# Patient Record
Sex: FEMALE | Race: WHITE | NOT HISPANIC OR LATINO | Employment: FULL TIME | URBAN - METROPOLITAN AREA
[De-identification: names, ages, dates, MRNs, and addresses within clinical notes are randomized per-mention and may not be internally consistent; named-entity substitution may affect disease eponyms.]

---

## 2020-05-05 DIAGNOSIS — Z01.84 ANTIBODY RESPONSE EXAMINATION: ICD-10-CM

## 2020-06-04 DIAGNOSIS — Z01.84 ANTIBODY RESPONSE EXAMINATION: ICD-10-CM

## 2020-06-24 ENCOUNTER — OFFICE VISIT (OUTPATIENT)
Dept: URGENT CARE | Facility: CLINIC | Age: 55
End: 2020-06-24
Payer: COMMERCIAL

## 2020-06-24 VITALS
HEART RATE: 68 BPM | DIASTOLIC BLOOD PRESSURE: 72 MMHG | BODY MASS INDEX: 23.92 KG/M2 | HEIGHT: 62 IN | WEIGHT: 130 LBS | TEMPERATURE: 97 F | OXYGEN SATURATION: 99 % | SYSTOLIC BLOOD PRESSURE: 109 MMHG

## 2020-06-24 DIAGNOSIS — Z03.818 ENCOUNTER FOR OBSERVATION FOR SUSPECTED EXPOSURE TO OTHER BIOLOGICAL AGENTS RULED OUT: ICD-10-CM

## 2020-06-24 PROCEDURE — U0003 INFECTIOUS AGENT DETECTION BY NUCLEIC ACID (DNA OR RNA); SEVERE ACUTE RESPIRATORY SYNDROME CORONAVIRUS 2 (SARS-COV-2) (CORONAVIRUS DISEASE [COVID-19]), AMPLIFIED PROBE TECHNIQUE, MAKING USE OF HIGH THROUGHPUT TECHNOLOGIES AS DESCRIBED BY CMS-2020-01-R: HCPCS

## 2020-06-24 PROCEDURE — 99201 PR OFFICE/OUTPT VISIT,NEW,LEVL I: ICD-10-PCS | Mod: S$GLB,,, | Performed by: PHYSICIAN ASSISTANT

## 2020-06-24 PROCEDURE — 99201 PR OFFICE/OUTPT VISIT,NEW,LEVL I: CPT | Mod: S$GLB,,, | Performed by: PHYSICIAN ASSISTANT

## 2020-06-24 RX ORDER — LISINOPRIL 10 MG/1
10 TABLET ORAL DAILY
COMMUNITY

## 2020-06-24 NOTE — PATIENT INSTRUCTIONS
1.  Take all medications as directed. If you have been prescribed antibiotics, make sure to complete them.   2.  Rest and keep yourself/patient well hydrated. For adults, it is recommended to drink at least 8-10 glasses of water daily.   3.  For patients above 6 months of age who are not allergic to and are not on anticoagulants, you can alternate Tylenol and Motrin every 4-6 hours for fever above 100.4F and/or pain.  For patients less than 6 months of age, allergic to or intolerant to NSAIDS, have gastritis, gastric ulcers, or history of GI bleeds, are pregnant, or are on anticoagulant therapy, you can take Tylenol every 4 hours as needed for fever above 100.4F and/or pain.   4. You should schedule a follow-up appointment with your Primary Care Provider/Pediatrician for recheck in 2-3 days or as directed at this visit.   5.  If your condition fails to improve in a timely manner, you should receive another evaluation by your Primary Care Provider/Pediatrician to discuss your concerns or return to urgent care for a recheck.  If your condition worsens at any time, you should report immediately to your nearest Emergency Department for further evaluation. **You must understand that you have received Urgent Care treatment only and that you may be released before all of your medical problems are known or treated. You, the patient, are responsible to arrange for follow-up care as instructed.       Instructions for Patients with Confirmed or Suspected COVID-19    If you are awaiting your test result, you will either be called or it will be released to the patient portal.  If you have any questions about your test, please visit [email or web address] or call our COVID-19 information line at [phone].      Preventing the Spread of Coronavirus Disease 2019 (COVID-19) in Homes and Residential Communities -- Patients     Prevention steps for people with confirmed or suspected COVID-19 (including persons under  investigation) who do not need to be hospitalized and people with confirmed COVID-19 who were hospitalized and determined to be medically stable to go home.    Stay home except to get medical care.    Separate yourself from other people and animals in your home.    Call ahead before visiting your doctor.    Wear a face mask.    Cover your coughs and sneezes.    Clean your hands often.    Avoid sharing personal household items.    Clean all high-touch surfaces every day.    Monitor your symptoms. Seek prompt medical attention if your illness is worsening (e.g., difficulty breathing). Before seeking care, call your healthcare provider.    If you have a medical emergency and must call 911, notify the dispatcher that you have or are being evaluated for COVID-19. If possible, put on a face mask before emergency medical services arrive.    Use the following symptom-based strategy to return to normal activity following a suspected or confirmed case of COVID-19. Continue isolation until:   o At least 3 days (72 hours) have passed since recovery defined as resolution of fever without the use of fever-reducing medications and improvement in respiratory symptoms (e.g. cough, shortness of breath), and   o At least 10 days have passed since symptoms first appeared.     Precautions for household members, intimate partners and caregivers in a non-healthcare setting of a patient with symptomatic laboratory-confirmed COVID-19 or a patient under investigation.     Household members, intimate partners and caregivers in a non-healthcare setting may have close contact with a person with symptomatic, laboratory-confirmed COVID-19 or a person under investigation. Close contacts should monitor their health; they should call their healthcare provider right away if they develop symptoms suggestive of COVID-19 (e.g., fever, cough, shortness of breath). Close contacts should also follow these recommendations:      Make sure that  you understand and can help the patient follow their healthcare providers instructions for medication(s) and care. You should help the patient with basic needs in the home and provide support for getting groceries, prescriptions, and other personal needs.    Monitor the patients symptoms. If the patient is getting sicker, call his or her healthcare provider and tell them that the patient has laboratory-confirmed COVID-19. This will help the healthcare providers office take steps to keep people in the office or waiting room from getting infected. Ask the healthcare provider to call the local or [facility] health department for additional guidance. If the patient has a medical emergency and you need to call 911, notify the dispatch personnel that the patient has or is being evaluated for COVID-19.    Household members should stay in another room or be  from the patient as much as possible. Household members should use a separate bedroom and bathroom, if available.    Prohibit visitors who do not have an essential need to be in the home.    Household members should care for any pets. Do not handle pets or other animals while sick.    Make sure that shared spaces in the home have good air flow, such as by an air conditioner.    Perform hand hygiene frequently. Wash your hands often with soap and water for at least 20 seconds or use an alcohol-based hand  that contains 60 to 95% alcohol, covering all surfaces of your hands and rubbing them together until they feel dry. Soap and water are preferred if hands are visibly dirty.    Avoid touching your eyes, nose and mouth with unwashed hands.    The patient should wear a face mask when you are around other people. If the patient is not able to wear a face mask (for example, because it causes trouble breathing), you, as the caregiver, should wear a mask when you are in the same room as the patient.    Wear a disposable face mask and gloves when you  touch or have contact with the patients blood, stool or body fluids, such as saliva, sputum, nasal mucus, vomit and urine.   o Throw out disposable face masks and gloves after using them. Do not reuse.   o When removing personal protective equipment, first remove and dispose of gloves. Then, immediately clean your hands with soap and water or alcohol-based hand . Next, remove and dispose of face mask, and immediately clean your hands again with soap and water or alcohol-based hand .    Avoid sharing household items with the patient. You should not share dishes, drinking glasses, cups, eating utensils, towels, bedding or other items. After the patient uses these items, you should wash them thoroughly (see below Wash laundry thoroughly).    Clean all high-touch surfaces, such as counters, tabletops, doorknobs, bathroom fixtures, toilets, phones, keyboards, tablets and bedside tables, every day. Also, clean any surfaces that may have blood, stool or body fluids on them.   o Use a household cleaning spray or wipe, according to the label instructions. Labels contain instructions for safe and effective use of the cleaning product including precautions you should take when applying the product, such as wearing gloves and making sure you have good ventilation during use of the product.    Wash laundry thoroughly.   o Immediately remove and wash clothes or bedding that have blood, stool or body fluids on them.  o Wear disposable gloves while handling soiled items and keep soiled items away from your body. Clean your hands (with soap and water or an alcohol-based hand ) immediately after removing your gloves.   o Read and follow directions on labels of laundry or clothing items and detergent. In general, using a normal laundry detergent according to washing machine instructions and dry thoroughly using the warmest temperatures recommended on the clothing label.    Place all used disposable  gloves, face masks and other contaminated items in a lined container before disposing of them with other household waste. Clean your hands (with soap and water or an alcohol-based hand ) immediately after handling these items. Soap and water should be used preferentially if hands are visibly dirty.   Discuss any additional questions with your state or local health department

## 2020-06-24 NOTE — PROGRESS NOTES
"Subjective:       Patient ID: Mariah Leyva is a 54 y.o. female.    Vitals:  height is 5' 2" (1.575 m) and weight is 59 kg (130 lb). Her temperature is 96.8 °F (36 °C). Her blood pressure is 109/72 and her pulse is 68. Her oxygen saturation is 99%.     Chief Complaint: COVID-19 Concerns    54-year-old female presents to clinic today requesting a coronavirus test.  Patient states that her chiropractor was diagnosed with coronavirus after she saw him 3 days ago.  Patient remains asymptomatic.  She denies any other complaints at this time.    Other  This is a new problem. The current episode started in the past 7 days (3 days ago exposed). The problem has been unchanged. Pertinent negatives include no arthralgias, chest pain, chills, congestion, coughing, fatigue, fever, headaches, joint swelling, myalgias, nausea, rash, sore throat, vertigo, vomiting or weakness. Nothing aggravates the symptoms. She has tried nothing for the symptoms.       Constitution: Negative for chills, fatigue and fever.   HENT: Negative for congestion and sore throat.    Neck: Negative for painful lymph nodes.   Cardiovascular: Negative for chest pain and leg swelling.   Eyes: Negative for double vision and blurred vision.   Respiratory: Negative for cough and shortness of breath.    Gastrointestinal: Negative for nausea, vomiting and diarrhea.   Genitourinary: Negative for dysuria, frequency, urgency and history of kidney stones.   Musculoskeletal: Negative for joint pain, joint swelling, muscle cramps and muscle ache.   Skin: Negative for color change, pale, rash and bruising.   Allergic/Immunologic: Negative for seasonal allergies.   Neurological: Negative for dizziness, history of vertigo, light-headedness, passing out and headaches.   Hematologic/Lymphatic: Negative for swollen lymph nodes.   Psychiatric/Behavioral: Negative for nervous/anxious, sleep disturbance and depression. The patient is not nervous/anxious.        Objective:    "   Physical Exam   Constitutional: She is oriented to person, place, and time. She does not appear ill. No distress.   HENT:   Head: Normocephalic and atraumatic.   Cardiovascular: Normal rate.   Pulmonary/Chest: Effort normal. No respiratory distress.   Abdominal: Normal appearance.   Neurological: She is alert and oriented to person, place, and time.   Psychiatric: Her behavior is normal. Mood, judgment and thought content normal.   Nursing note and vitals reviewed.        Assessment:       1. Encounter for observation for suspected exposure to other biological agents ruled out        Plan:         Encounter for observation for suspected exposure to other biological agents ruled out  -     COVID-19 Routine Screening        Counseled patient and answered questions in regards to COVID-19 testing and diagnosis for 5 min.  Symptomatic treatment/quarantine discussed.      Patient Instructions   1.  Take all medications as directed. If you have been prescribed antibiotics, make sure to complete them.   2.  Rest and keep yourself/patient well hydrated. For adults, it is recommended to drink at least 8-10 glasses of water daily.   3.  For patients above 6 months of age who are not allergic to and are not on anticoagulants, you can alternate Tylenol and Motrin every 4-6 hours for fever above 100.4F and/or pain.  For patients less than 6 months of age, allergic to or intolerant to NSAIDS, have gastritis, gastric ulcers, or history of GI bleeds, are pregnant, or are on anticoagulant therapy, you can take Tylenol every 4 hours as needed for fever above 100.4F and/or pain.   4. You should schedule a follow-up appointment with your Primary Care Provider/Pediatrician for recheck in 2-3 days or as directed at this visit.   5.  If your condition fails to improve in a timely manner, you should receive another evaluation by your Primary Care Provider/Pediatrician to discuss your concerns or return to urgent care for a recheck.  If  your condition worsens at any time, you should report immediately to your nearest Emergency Department for further evaluation. **You must understand that you have received Urgent Care treatment only and that you may be released before all of your medical problems are known or treated. You, the patient, are responsible to arrange for follow-up care as instructed.       Instructions for Patients with Confirmed or Suspected COVID-19    If you are awaiting your test result, you will either be called or it will be released to the patient portal.  If you have any questions about your test, please visit [email or web address] or call our COVID-19 information line at [phone].      Preventing the Spread of Coronavirus Disease 2019 (COVID-19) in Homes and Residential Communities -- Patients     Prevention steps for people with confirmed or suspected COVID-19 (including persons under investigation) who do not need to be hospitalized and people with confirmed COVID-19 who were hospitalized and determined to be medically stable to go home.    Stay home except to get medical care.    Separate yourself from other people and animals in your home.    Call ahead before visiting your doctor.    Wear a face mask.    Cover your coughs and sneezes.    Clean your hands often.    Avoid sharing personal household items.    Clean all high-touch surfaces every day.    Monitor your symptoms. Seek prompt medical attention if your illness is worsening (e.g., difficulty breathing). Before seeking care, call your healthcare provider.    If you have a medical emergency and must call 911, notify the dispatcher that you have or are being evaluated for COVID-19. If possible, put on a face mask before emergency medical services arrive.    Use the following symptom-based strategy to return to normal activity following a suspected or confirmed case of COVID-19. Continue isolation until:   o At least 3 days (72 hours) have passed  since recovery defined as resolution of fever without the use of fever-reducing medications and improvement in respiratory symptoms (e.g. cough, shortness of breath), and   o At least 10 days have passed since symptoms first appeared.     Precautions for household members, intimate partners and caregivers in a non-healthcare setting of a patient with symptomatic laboratory-confirmed COVID-19 or a patient under investigation.     Household members, intimate partners and caregivers in a non-healthcare setting may have close contact with a person with symptomatic, laboratory-confirmed COVID-19 or a person under investigation. Close contacts should monitor their health; they should call their healthcare provider right away if they develop symptoms suggestive of COVID-19 (e.g., fever, cough, shortness of breath). Close contacts should also follow these recommendations:      Make sure that you understand and can help the patient follow their healthcare providers instructions for medication(s) and care. You should help the patient with basic needs in the home and provide support for getting groceries, prescriptions, and other personal needs.    Monitor the patients symptoms. If the patient is getting sicker, call his or her healthcare provider and tell them that the patient has laboratory-confirmed COVID-19. This will help the healthcare providers office take steps to keep people in the office or waiting room from getting infected. Ask the healthcare provider to call the local or state health department for additional guidance. If the patient has a medical emergency and you need to call 911, notify the dispatch personnel that the patient has or is being evaluated for COVID-19.    Household members should stay in another room or be  from the patient as much as possible. Household members should use a separate bedroom and bathroom, if available.    Prohibit visitors who do not have an essential need to be in  the home.    Household members should care for any pets. Do not handle pets or other animals while sick.    Make sure that shared spaces in the home have good air flow, such as by an air conditioner.    Perform hand hygiene frequently. Wash your hands often with soap and water for at least 20 seconds or use an alcohol-based hand  that contains 60 to 95% alcohol, covering all surfaces of your hands and rubbing them together until they feel dry. Soap and water are preferred if hands are visibly dirty.    Avoid touching your eyes, nose and mouth with unwashed hands.    The patient should wear a face mask when you are around other people. If the patient is not able to wear a face mask (for example, because it causes trouble breathing), you, as the caregiver, should wear a mask when you are in the same room as the patient.    Wear a disposable face mask and gloves when you touch or have contact with the patients blood, stool or body fluids, such as saliva, sputum, nasal mucus, vomit and urine.   o Throw out disposable face masks and gloves after using them. Do not reuse.   o When removing personal protective equipment, first remove and dispose of gloves. Then, immediately clean your hands with soap and water or alcohol-based hand . Next, remove and dispose of face mask, and immediately clean your hands again with soap and water or alcohol-based hand .    Avoid sharing household items with the patient. You should not share dishes, drinking glasses, cups, eating utensils, towels, bedding or other items. After the patient uses these items, you should wash them thoroughly (see below Wash laundry thoroughly).    Clean all high-touch surfaces, such as counters, tabletops, doorknobs, bathroom fixtures, toilets, phones, keyboards, tablets and bedside tables, every day. Also, clean any surfaces that may have blood, stool or body fluids on them.   o Use a household cleaning spray or wipe,  according to the label instructions. Labels contain instructions for safe and effective use of the cleaning product including precautions you should take when applying the product, such as wearing gloves and making sure you have good ventilation during use of the product.    Wash laundry thoroughly.   o Immediately remove and wash clothes or bedding that have blood, stool or body fluids on them.  o Wear disposable gloves while handling soiled items and keep soiled items away from your body. Clean your hands (with soap and water or an alcohol-based hand ) immediately after removing your gloves.   o Read and follow directions on labels of laundry or clothing items and detergent. In general, using a normal laundry detergent according to washing machine instructions and dry thoroughly using the warmest temperatures recommended on the clothing label.    Place all used disposable gloves, face masks and other contaminated items in a lined container before disposing of them with other household waste. Clean your hands (with soap and water or an alcohol-based hand ) immediately after handling these items. Soap and water should be used preferentially if hands are visibly dirty.   Discuss any additional questions with your state or local health department

## 2020-06-29 ENCOUNTER — TELEPHONE (OUTPATIENT)
Dept: URGENT CARE | Facility: CLINIC | Age: 55
End: 2020-06-29
Payer: COMMERCIAL

## 2020-06-29 LAB — SARS-COV-2 RNA RESP QL NAA+PROBE: NOT DETECTED

## 2020-07-04 DIAGNOSIS — Z01.84 ANTIBODY RESPONSE EXAMINATION: ICD-10-CM

## 2020-08-03 DIAGNOSIS — Z01.84 ANTIBODY RESPONSE EXAMINATION: ICD-10-CM

## 2020-09-02 DIAGNOSIS — Z01.84 ANTIBODY RESPONSE EXAMINATION: ICD-10-CM

## 2020-10-02 ENCOUNTER — PATIENT MESSAGE (OUTPATIENT)
Dept: ADMINISTRATIVE | Facility: OTHER | Age: 55
End: 2020-10-02

## 2020-10-02 DIAGNOSIS — Z01.84 ANTIBODY RESPONSE EXAMINATION: ICD-10-CM

## 2020-11-01 DIAGNOSIS — Z01.84 ANTIBODY RESPONSE EXAMINATION: ICD-10-CM

## 2020-12-01 DIAGNOSIS — Z01.84 ANTIBODY RESPONSE EXAMINATION: ICD-10-CM

## 2021-05-06 ENCOUNTER — PATIENT MESSAGE (OUTPATIENT)
Dept: RESEARCH | Facility: HOSPITAL | Age: 56
End: 2021-05-06

## 2023-11-28 PROBLEM — R35.0 URINARY FREQUENCY: Status: ACTIVE | Noted: 2023-11-28

## 2023-11-28 PROBLEM — R39.15 URGENCY OF URINATION: Status: ACTIVE | Noted: 2023-11-28

## 2023-11-28 PROBLEM — N39.3 STRESS INCONTINENCE: Status: ACTIVE | Noted: 2023-11-28

## 2024-08-27 PROBLEM — G43.E01 CHRONIC MIGRAINE WITH AURA AND WITH STATUS MIGRAINOSUS, NOT INTRACTABLE: Status: ACTIVE | Noted: 2024-08-27

## 2024-10-17 ENCOUNTER — OFFICE VISIT (OUTPATIENT)
Dept: NEUROLOGY | Facility: CLINIC | Age: 59
End: 2024-10-17
Payer: COMMERCIAL

## 2024-10-17 VITALS
HEART RATE: 70 BPM | RESPIRATION RATE: 16 BRPM | HEIGHT: 63 IN | SYSTOLIC BLOOD PRESSURE: 112 MMHG | OXYGEN SATURATION: 99 % | WEIGHT: 135.94 LBS | DIASTOLIC BLOOD PRESSURE: 86 MMHG | BODY MASS INDEX: 24.09 KG/M2

## 2024-10-17 DIAGNOSIS — G43.E09 CHRONIC MIGRAINE WITH AURA WITHOUT STATUS MIGRAINOSUS, NOT INTRACTABLE: Primary | ICD-10-CM

## 2024-10-17 DIAGNOSIS — D53.9 MACROCYTIC ANEMIA: ICD-10-CM

## 2024-10-17 DIAGNOSIS — G43.909 ACUTE MIGRAINE: ICD-10-CM

## 2024-10-17 DIAGNOSIS — R51.9 HEADACHE, WORSENING: ICD-10-CM

## 2024-10-17 DIAGNOSIS — I10 HYPERTENSION, UNSPECIFIED TYPE: ICD-10-CM

## 2024-10-17 PROBLEM — G43.E01 CHRONIC MIGRAINE WITH AURA AND WITH STATUS MIGRAINOSUS, NOT INTRACTABLE: Status: RESOLVED | Noted: 2024-08-27 | Resolved: 2024-10-17

## 2024-10-17 PROCEDURE — 3008F BODY MASS INDEX DOCD: CPT | Mod: CPTII,S$GLB,, | Performed by: NURSE PRACTITIONER

## 2024-10-17 PROCEDURE — 4010F ACE/ARB THERAPY RXD/TAKEN: CPT | Mod: CPTII,S$GLB,, | Performed by: NURSE PRACTITIONER

## 2024-10-17 PROCEDURE — 99205 OFFICE O/P NEW HI 60 MIN: CPT | Mod: S$GLB,,, | Performed by: NURSE PRACTITIONER

## 2024-10-17 PROCEDURE — 99999 PR PBB SHADOW E&M-EST. PATIENT-LVL IV: CPT | Mod: PBBFAC,,, | Performed by: NURSE PRACTITIONER

## 2024-10-17 PROCEDURE — 3074F SYST BP LT 130 MM HG: CPT | Mod: CPTII,S$GLB,, | Performed by: NURSE PRACTITIONER

## 2024-10-17 PROCEDURE — 3079F DIAST BP 80-89 MM HG: CPT | Mod: CPTII,S$GLB,, | Performed by: NURSE PRACTITIONER

## 2024-10-17 PROCEDURE — 1159F MED LIST DOCD IN RCRD: CPT | Mod: CPTII,S$GLB,, | Performed by: NURSE PRACTITIONER

## 2024-10-17 RX ORDER — NORTRIPTYLINE HYDROCHLORIDE 10 MG/1
10 CAPSULE ORAL NIGHTLY
Qty: 30 CAPSULE | Refills: 11 | Status: SHIPPED | OUTPATIENT
Start: 2024-10-17 | End: 2025-10-17

## 2024-10-17 RX ORDER — ESTRADIOL 0.1 MG/G
CREAM VAGINAL
COMMUNITY
Start: 2024-09-20

## 2024-10-17 RX ORDER — FUROSEMIDE 20 MG/1
TABLET ORAL
COMMUNITY
Start: 2024-08-05

## 2024-10-17 RX ORDER — UBROGEPANT 100 MG/1
100 TABLET ORAL
Qty: 16 TABLET | Refills: 5 | Status: SHIPPED | OUTPATIENT
Start: 2024-10-17

## 2024-10-17 NOTE — PATIENT INSTRUCTIONS
Please update me on the portal in 2 weeks and let me know how you are responding to Pamelor and if you are tolerating this dose.     Keep a headache log until your next visit.

## 2024-10-17 NOTE — PROGRESS NOTES
Subjective:      Chief Complaint:  No chief complaint on file.    Patient lives in [place]. She works at [employer] in the [employer].     History of Present Illness  Mariah Leyva is a 58 y.o. female with chronic migraine without aura. She has HTN. History of L-spine fusion and cervical cancer.     She presents today for an initial visit for headaches.     Headaches:  Presents today for evaluation of headache, which started days/weeks/months: 7 years ago after the death of her son  Frequency: daily headaches, but severe once each week.   Duration: all day  The symptoms start with/without aura: seeing spots before headaches with more severe headaches.  The pain usually starts in the: frontal, occipital, holocephalic  Pain ranges between: 6/10  Headache pain is described as: pulsating  Associated symptoms include: + photophobia, + phonophobia, + nausea, she sometimes has nosebleeds after a headache (CT Head was normal), general weakness, dizziness (room spinning sensation).   no paresthesias  Neck pain: denies. She tries to get massages regularly.     Abortive therapy tried:  Imitrex-effective. She does not take this often-maybe once every 1-2 weeks.   BC Powder-takes this 1-2x per week.   Compazine-effective. She took this a few months ago after having increased headaches after having a tooth pulled. This is resolved    Preventative therapy tried:   Topamax-poorly tolerated. PCP started this.   She is taking Lisinopril for HTN.   Botox is listed on her medication list, but she has never taken this. Dr. Ferrell at [facility] ordered this, but this was not approved.     She does not sleep well unless she takes CBD products.     Triggers include: stress  Alleviating factors: laying down, taking a CBD gummy  Prior workup includes: CT Head in 2023 was unremarkable  Anxiety/depression: denies  Snore at night/wake up gasping: denies  On treatment for HTN: on Lisinopril.   Sinus congestion: denies  Any  recent medication changes: denies    Denies family history of aneurysm    I have reviewed all of this patient's past medical and surgical histories as well as family and social histories and active allergies and medications as documented in the electronic medical record.    Review of Systems  Review of Systems   Constitutional:  Negative for activity change, appetite change, fatigue, fever and unexpected weight change.   HENT:  Negative for congestion, drooling, ear pain, hearing loss, mouth sores, sinus pressure, tinnitus, trouble swallowing and voice change.    Eyes: Negative.  Negative for photophobia and visual disturbance.         No Blurred vision   Respiratory:  Negative for apnea, choking and shortness of breath.    Cardiovascular:  Negative for chest pain, palpitations and leg swelling.   Gastrointestinal:  Negative for constipation, diarrhea, nausea and vomiting.   Genitourinary:  Negative for dysuria.   Musculoskeletal:  Negative for arthralgias, back pain, gait problem, joint swelling, myalgias, neck pain and neck stiffness.   Skin:  Negative for rash.   Neurological:  Positive for headaches. Negative for dizziness, tremors, seizures, syncope, facial asymmetry, speech difficulty, weakness, light-headedness and numbness.   Hematological:  Does not bruise/bleed easily.   Psychiatric/Behavioral:  Negative for agitation, behavioral problems, confusion, decreased concentration, dysphoric mood, hallucinations, sleep disturbance and suicidal ideas. The patient is not nervous/anxious.        Objective:     There were no vitals filed for this visit.       Exam:  Gen Appearance, well developed/nourished in no apparent distress  CV: 2+ distal pulses with no edema or swelling  Neuro:  MS: Awake, alert, oriented to place, person, time, situation. Sustains attention. Recent/remote memory intact, Language is full to spontaneous speech/repetition/naming/comprehension. Fund of Knowledge is full. Circumferential speech.    CN: PERRL, Extraoccular movements and visual fields are full. Normal facial sensation and strength, Hearing symmetric, Tongue and Palate are midline and strong. Shoulder Shrug symmetric and strong.  Motor: Normal bulk, tone, no abnormal movements. 5/5 strength bilateral upper/lower extremities with 2+ reflexes and bilateral plantar response  Sensory: symmetric to light touch, pain, temp, and vibration/proprioception. Romberg negative  Cerebellar: Finger-nose,Heal-shin, Rapid alternating movements intact  Gait: Normal stance, no ataxia    Imagin/2023 CT Head:   FINDINGS:  No ventricular or basal cistern effacement.  No evidence of acute intracranial hemorrhage, midline shift, mass, or mass effect.  No detected extra-axial fluid collections.  Bilateral basal ganglia calcifications are present.  Mild dependent fluid right maxillary sinus.  Remainder of the imaged paranasal sinuses and mastoid air cells are clear.  Calvarium is intact.     Impression:     No acute intracranial process detected.    2023 MRI C-spine:   FINDINGS:  Nonspecific straightening of the cervical lordosis.  Vertebral body heights are maintained.  No suspicious marrow lesion or edema.  No signal abnormalities within the spinal cord.     C2-3, no disc bulge canal or foraminal stenosis     C3-4, no significant disc bulge canal or foraminal stenosis.     C4-5, mildly narrowed disc with small disc bulge, mild uncinate process hypertrophy and facet arthropathy resulting mild right foraminal stenosis.  No central canal or left foraminal stenosis     C5-6, narrowed desiccated disc with mild disc bulge, uncinate hypertrophy and facet arthropathy result in mild bilateral foraminal stenosis.  No significant central canal stenosis.     C6-7, narrowed desiccated disc with slight disc bulge and uncinate process hypertrophy.  No significant stenosis     C7-T1, no disc bulge canal or foraminal stenosis     Impression:     Cervical spondylosis greatest  C4-5 C5-6 and C6-7 with partial effacement of anterior subarachnoid space, mild right C4-5 and bilateral C5-6 foraminal stenosis       Labs:       Assessment:      1. Chronic migraine with aura and with status migrainosus, not intractable        Plan:   I recommend:   Start Pamelor for migraine prevention. She is sensitive to medication. Will start at 10 mg and titrate if needed, as tolerated.   -keep a headache log until the next visit.   -she failed Topamax prior.   -she is on an anti-HTN medication, and is not a candidate for beta blockade.   -if she fails Pamelor or does not tolerate this, we will proceed with Botox.     2. Regarding abortive therapy:   -stop Imitrex, due to aura complaints and HTN history.   -start Nurtec  to abort migraines, as she is not a candidate for triptans.     3. MRI Brain and MRA to evaluate for structural contributions, given worsening migraines after age 49 and recent nosebleeds.   Check B12 and folate, given macrocytic anemia.     FU 2 months  60 minutes of total time spent on the encounter, which includes face to face time and non-face to face time preparing to see the patient (eg, review of tests), Obtaining and/or reviewing separately obtained history, Documenting clinical information in the electronic or other health record, Independently interpreting results (not separately reported) and communicating results to the patient/family/caregiver, or Care coordination (not separately reported).

## 2024-11-05 DIAGNOSIS — G43.909 ACUTE MIGRAINE: Primary | ICD-10-CM

## 2024-11-05 RX ORDER — BUTALBITAL, ACETAMINOPHEN AND CAFFEINE 50; 325; 40 MG/1; MG/1; MG/1
1 TABLET ORAL DAILY PRN
Qty: 9 TABLET | Refills: 5 | Status: SHIPPED | OUTPATIENT
Start: 2024-11-05 | End: 2024-12-05

## 2024-11-05 RX ORDER — PROCHLORPERAZINE MALEATE 10 MG
10 TABLET ORAL DAILY PRN
Qty: 10 TABLET | Refills: 5 | Status: SHIPPED | OUTPATIENT
Start: 2024-11-05

## 2025-01-07 ENCOUNTER — OFFICE VISIT (OUTPATIENT)
Dept: NEUROLOGY | Facility: CLINIC | Age: 60
End: 2025-01-07
Payer: COMMERCIAL

## 2025-01-07 VITALS
BODY MASS INDEX: 24.76 KG/M2 | HEIGHT: 63 IN | SYSTOLIC BLOOD PRESSURE: 114 MMHG | WEIGHT: 139.75 LBS | RESPIRATION RATE: 16 BRPM | OXYGEN SATURATION: 99 % | HEART RATE: 63 BPM | DIASTOLIC BLOOD PRESSURE: 70 MMHG

## 2025-01-07 DIAGNOSIS — G43.E09 CHRONIC MIGRAINE WITH AURA WITHOUT STATUS MIGRAINOSUS, NOT INTRACTABLE: Primary | ICD-10-CM

## 2025-01-07 DIAGNOSIS — I10 HYPERTENSION, UNSPECIFIED TYPE: ICD-10-CM

## 2025-01-07 DIAGNOSIS — G43.909 ACUTE MIGRAINE: ICD-10-CM

## 2025-01-07 PROCEDURE — 3008F BODY MASS INDEX DOCD: CPT | Mod: CPTII,S$GLB,, | Performed by: NURSE PRACTITIONER

## 2025-01-07 PROCEDURE — 99999 PR PBB SHADOW E&M-EST. PATIENT-LVL III: CPT | Mod: PBBFAC,,, | Performed by: NURSE PRACTITIONER

## 2025-01-07 PROCEDURE — 99214 OFFICE O/P EST MOD 30 MIN: CPT | Mod: S$GLB,,, | Performed by: NURSE PRACTITIONER

## 2025-01-07 PROCEDURE — 3074F SYST BP LT 130 MM HG: CPT | Mod: CPTII,S$GLB,, | Performed by: NURSE PRACTITIONER

## 2025-01-07 PROCEDURE — 1159F MED LIST DOCD IN RCRD: CPT | Mod: CPTII,S$GLB,, | Performed by: NURSE PRACTITIONER

## 2025-01-07 PROCEDURE — 3078F DIAST BP <80 MM HG: CPT | Mod: CPTII,S$GLB,, | Performed by: NURSE PRACTITIONER

## 2025-01-07 RX ORDER — SUMATRIPTAN SUCCINATE 50 MG/1
50 TABLET ORAL DAILY PRN
Qty: 9 TABLET | Refills: 5 | Status: SHIPPED | OUTPATIENT
Start: 2025-01-07 | End: 2025-02-06

## 2025-01-07 NOTE — PROGRESS NOTES
Subjective:      Chief Complaint:  No chief complaint on file.    Patient lives in [place]. She works at [employer] in the [employer].     History of Present Illness  Mariah Leyva is a 59 y.o. female with chronic migraine without aura. She has HTN. History of L-spine fusion and cervical cancer.     She presents today for a follow up visit. MRI Brain and MRA done at her last visit were unremarkable.     Nurtec started as an abortive agent. She had side effects/nausea with this. Compazine then started, which also caused fatigue.     Pamelor started for migraine prevention, and this was not tolerated, due to daytime somnolence.     She was advised to stop taking Imitrex at her last visit, given HTN history. She continues to take 50 mg once per week. She checks her BP before she takes this, and BP is normal prior to.     Currently, she continues with daily migraines, which last for several hours at a time.    She does have chronic neck tension/spasm, which she gets massages for.     I have reviewed all of this patient's past medical and surgical histories as well as family and social histories and active allergies and medications as documented in the electronic medical record.    Review of Systems  Review of Systems   Constitutional:  Negative for activity change, appetite change, fatigue, fever and unexpected weight change.   HENT:  Negative for congestion, drooling, ear pain, hearing loss, mouth sores, sinus pressure, tinnitus, trouble swallowing and voice change.    Eyes: Negative.  Negative for photophobia and visual disturbance.         No Blurred vision   Respiratory:  Negative for apnea, choking and shortness of breath.    Cardiovascular:  Negative for chest pain, palpitations and leg swelling.   Gastrointestinal:  Negative for constipation, diarrhea, nausea and vomiting.   Genitourinary:  Negative for dysuria.   Musculoskeletal:  Positive for neck pain. Negative for arthralgias, back pain, gait problem, joint  swelling, myalgias and neck stiffness.   Skin:  Negative for rash.   Neurological:  Positive for headaches. Negative for dizziness, tremors, seizures, syncope, facial asymmetry, speech difficulty, weakness, light-headedness and numbness.   Hematological:  Does not bruise/bleed easily.   Psychiatric/Behavioral:  Negative for agitation, behavioral problems, confusion, decreased concentration, dysphoric mood, hallucinations, sleep disturbance and suicidal ideas. The patient is not nervous/anxious.        Objective:       Vitals:    01/07/25 1118   BP: 114/70   Pulse: 63   Resp: 16     Exam:  Gen Appearance, well developed/nourished in no apparent distress  CV: 2+ distal pulses with no edema or swelling  Neuro:  MS: Awake, alert, oriented to place, person, time, situation. Sustains attention. Recent/remote memory intact, Language is full to spontaneous speech/repetition/naming/comprehension. Fund of Knowledge is full. Circumferential speech.   CN: PERRL, Extraoccular movements and visual fields are full. Normal facial sensation and strength, Hearing symmetric, Tongue and Palate are midline and strong. Shoulder Shrug symmetric and strong.  Motor: Normal bulk, tone, no abnormal movements. 5/5 strength bilateral upper/lower extremities with 2+ reflexes and bilateral plantar response  Sensory: symmetric to light touch, pain, temp, and vibration/proprioception. Romberg negative  Cerebellar: Finger-nose,Heal-shin, Rapid alternating movements intact  Gait: Normal stance, no ataxia    Imaging:   10/28/24 MRI Brain:      FINDINGS:  Motion on some images.  No focal brain parenchymal signal abnormality.  No mass or mass effect.  No areas of restricted diffusion to indicate acute ischemia.  No evidence of intracranial hemorrhage.  Normal flow voids in the visualized cerebral vasculature.  Craniocervical junction appears intact and images of sella demonstrate no abnormality.     Impression:     No abnormality.    10/28/24 MRA:    FINDINGS:  The intracranial portion of the right internal carotid artery is patent with no stenosis or aneurysm.  Right middle and right anterior cerebral arteries are patent with no stenosis or aneurysm.  Intracranial portion of left internal carotid artery is patent with no stenosis or aneurysm including the left middle and left anterior cerebral arteries.  Basilar artery is patent with no stenosis or aneurysm including the bilateral posterior cerebral arteries.     Impression:     No intracranial arterial stenosis or aneurysm.    8/2023 CT Head:   FINDINGS:  No ventricular or basal cistern effacement.  No evidence of acute intracranial hemorrhage, midline shift, mass, or mass effect.  No detected extra-axial fluid collections.  Bilateral basal ganglia calcifications are present.  Mild dependent fluid right maxillary sinus.  Remainder of the imaged paranasal sinuses and mastoid air cells are clear.  Calvarium is intact.     Impression:     No acute intracranial process detected.    9/2023 MRI C-spine:   FINDINGS:  Nonspecific straightening of the cervical lordosis.  Vertebral body heights are maintained.  No suspicious marrow lesion or edema.  No signal abnormalities within the spinal cord.     C2-3, no disc bulge canal or foraminal stenosis     C3-4, no significant disc bulge canal or foraminal stenosis.     C4-5, mildly narrowed disc with small disc bulge, mild uncinate process hypertrophy and facet arthropathy resulting mild right foraminal stenosis.  No central canal or left foraminal stenosis     C5-6, narrowed desiccated disc with mild disc bulge, uncinate hypertrophy and facet arthropathy result in mild bilateral foraminal stenosis.  No significant central canal stenosis.     C6-7, narrowed desiccated disc with slight disc bulge and uncinate process hypertrophy.  No significant stenosis     C7-T1, no disc bulge canal or foraminal stenosis     Impression:     Cervical spondylosis greatest C4-5 C5-6 and  C6-7 with partial effacement of anterior subarachnoid space, mild right C4-5 and bilateral C5-6 foraminal stenosis     Labs:       Assessment:      1. Chronic migraine with aura without status migrainosus, not intractable        Plan:   I recommend:   Start Botox per migraine protocol. She has greater than 15 migraine days per month, which last for several hours at a time.   Administration: Inject 0.1 mL (5 units) intramuscularly at each injection site:     - muscle- 10 units divided in 2 sites   -Porcerus muscle- 5 units in 1 site   -Frontalis muscle- 20 units divided in 4 sites   -Temporalis muscle- 40 units divided in 8 sites   -Occipitalis muscle- 30 units divided in 6 sites   -Cervical paraspinals muscle- 20 units divided in 4 sites   -Trapezius muscle- 30 units divided in 31 sites     * Doses distributed bilaterally    There is an unavoidable waste of 45 units with Botox administration, as Botox is only supplied in 100 unit and 200 unit dosing.     -she failed Pamelor, and this was poorly tolerated.   -keep a headache log until the next visit.   -she failed Topamax prior.   -she is on an anti-HTN medication, and is not a candidate for beta blockade.     2. Regarding abortive therapy:   -Compazine was poorly tolerated, due to somnolence  -Nurtec was poorly tolerated, due to nausea.   -she may continue prn Imitrex 50 mg if BP less than 130/90. She does check this prior to use. Limit to no more than 2 days per week to avoid medication rebound headaches.     3. MRI Brain and MRA unremarkable.   Check B12 and folate, given macrocytic anemia.     RTC for Botox in 2 weeks  FU 2 months for Botox follow up

## 2025-01-07 NOTE — PATIENT INSTRUCTIONS
Please complete B12 and folate levels ordered at your last visit. The lab orders are still active in Epic.

## 2025-01-24 ENCOUNTER — TELEPHONE (OUTPATIENT)
Dept: NEUROLOGY | Facility: CLINIC | Age: 60
End: 2025-01-24
Payer: COMMERCIAL

## 2025-01-24 NOTE — TELEPHONE ENCOUNTER
----- Message from Abena sent at 2025  9:04 AM CST -----  Contact: PATIENT  Mariah Leyva  MRN: [id number]  : 1965  PCP: Geovanni Bowling  Home Phone      [phone]  Work Phone      Not on file.  Mobile          [phone]      MESSAGE: Patient is needing to reschedule the Botox appointment that was scheduled for 25.        Phone: [phone]

## 2025-01-28 ENCOUNTER — PROCEDURE VISIT (OUTPATIENT)
Dept: NEUROLOGY | Facility: CLINIC | Age: 60
End: 2025-01-28
Payer: COMMERCIAL

## 2025-01-28 DIAGNOSIS — G43.E09 CHRONIC MIGRAINE WITH AURA WITHOUT STATUS MIGRAINOSUS, NOT INTRACTABLE: Primary | ICD-10-CM

## 2025-01-28 PROCEDURE — 64615 CHEMODENERV MUSC MIGRAINE: CPT | Mod: JZ,TB,S$GLB, | Performed by: NURSE PRACTITIONER

## 2025-01-28 NOTE — PROCEDURES
Procedures  BOTOX PROCEDURE NOTE     Date of Procedure: 01/28/2025      Reason for Proceedure: Chronic Migraine     Informed consent was obtained prior to performing this study. Two patient identifiers were confirmed with the patient prior to performing this study. A time out to determine correct patient and and agreement on procedure performed was conducted prior to the injections.     Procedure Details: After informed consent obtained the patient's head and upper neck was cleansed with alcohol rub and 155 Units of Botox (diluted 1:1) was injected in the following bilateral muscles:   10 units in corregator* (over 2 sites), 5 units in Procerus, 20 units Frontalis* (over 4 sites), 40 units in Temporalis* (over 4 sites), 30 units in occipitalis* (over 6 sites), 20 units in the cervical paraspinal muscles* (over 4 sites), and 30 units in Trapezius* (over 4 sites). The remaining 45 units were wasted to follow recommended guidelines for treatment of chonic migraines with Botox   *= denotes bilateral injections    The patient tolerated the procedure well with no more than 1cc of blood loss. He was observed for several minutes post injection and given a handout from [facility] regarding when and where to seek help if side effects are experienced.

## 2025-02-10 ENCOUNTER — TELEPHONE (OUTPATIENT)
Dept: NEUROLOGY | Facility: CLINIC | Age: 60
End: 2025-02-10
Payer: COMMERCIAL

## 2025-02-10 NOTE — TELEPHONE ENCOUNTER
----- Message from Abena sent at 2/10/2025  2:49 PM CST -----  Contact: PATIENT  Mariah Leyva  MRN: [id number]  : 1965  PCP: Geovanni Bowling  Home Phone      [phone]  Work Phone      Not on file.  Mobile          [phone]      MESSAGE: Patient has questions about the Botox injections that she received on 25.        Phone: [phone]

## 2025-02-10 NOTE — TELEPHONE ENCOUNTER
Patient states that she had botox on 1/28/25 and is concerned that she is still having neck pain where the injections were given. Patient c/o tension in neck, headache 5/10 pain. Please advise.

## 2025-02-11 ENCOUNTER — TELEPHONE (OUTPATIENT)
Dept: NEUROLOGY | Facility: CLINIC | Age: 60
End: 2025-02-11
Payer: COMMERCIAL

## 2025-02-11 NOTE — TELEPHONE ENCOUNTER
Patients can sometimes experience increased headaches and neck tension in the first couple weeks after receiving Botox, especially with the first round of injections. This should improve over the next week.

## 2025-02-11 NOTE — TELEPHONE ENCOUNTER
----- Message from Abena sent at 2025  1:44 PM CST -----  Contact: PATIENT  Mariah Leyva  MRN: [id number]  : 1965  PCP: Geovanni Bowling.  Home Phone      [phone]  Work Phone      Not on file.  Mobile          [phone]      MESSAGE: Patient is needing a letter stating what Berenice is treating her for emailed to [email or web address]. She would also like a copy of it emailed to her as well at [email or web address]        Phone: [phone]

## 2025-02-11 NOTE — TELEPHONE ENCOUNTER
Letter written. Can we check with Shyanne on whether her message is enough consent to email the letter to her supervisor or if we need a signed consent from her to do this, as far as HIPAA goes?

## 2025-02-11 NOTE — LETTER
February 11, 2025    Mariah B Autumn  [address]  [place] [address]             [facility]  [address] [address]  Phone: [phone]  Fax: [phone] To Whom it May Concern:     Yeison Leyva is being treated for chronic migraines by [facility].       Berenice Ferreira, NP

## 2025-02-12 ENCOUNTER — PATIENT MESSAGE (OUTPATIENT)
Dept: NEUROLOGY | Facility: CLINIC | Age: 60
End: 2025-02-12
Payer: COMMERCIAL

## 2025-03-06 ENCOUNTER — OFFICE VISIT (OUTPATIENT)
Dept: NEUROLOGY | Facility: CLINIC | Age: 60
End: 2025-03-06
Payer: COMMERCIAL

## 2025-03-06 VITALS
RESPIRATION RATE: 16 BRPM | WEIGHT: 137.13 LBS | BODY MASS INDEX: 24.3 KG/M2 | HEART RATE: 66 BPM | HEIGHT: 63 IN | SYSTOLIC BLOOD PRESSURE: 106 MMHG | OXYGEN SATURATION: 98 % | DIASTOLIC BLOOD PRESSURE: 70 MMHG

## 2025-03-06 DIAGNOSIS — G43.E09 CHRONIC MIGRAINE WITH AURA WITHOUT STATUS MIGRAINOSUS, NOT INTRACTABLE: Primary | ICD-10-CM

## 2025-03-06 PROCEDURE — 3074F SYST BP LT 130 MM HG: CPT | Mod: CPTII,S$GLB,, | Performed by: NURSE PRACTITIONER

## 2025-03-06 PROCEDURE — 3008F BODY MASS INDEX DOCD: CPT | Mod: CPTII,S$GLB,, | Performed by: NURSE PRACTITIONER

## 2025-03-06 PROCEDURE — 3078F DIAST BP <80 MM HG: CPT | Mod: CPTII,S$GLB,, | Performed by: NURSE PRACTITIONER

## 2025-03-06 PROCEDURE — 1160F RVW MEDS BY RX/DR IN RCRD: CPT | Mod: CPTII,S$GLB,, | Performed by: NURSE PRACTITIONER

## 2025-03-06 PROCEDURE — 99214 OFFICE O/P EST MOD 30 MIN: CPT | Mod: S$GLB,,, | Performed by: NURSE PRACTITIONER

## 2025-03-06 PROCEDURE — 1159F MED LIST DOCD IN RCRD: CPT | Mod: CPTII,S$GLB,, | Performed by: NURSE PRACTITIONER

## 2025-03-06 PROCEDURE — 99999 PR PBB SHADOW E&M-EST. PATIENT-LVL III: CPT | Mod: PBBFAC,,, | Performed by: NURSE PRACTITIONER

## 2025-03-06 NOTE — PROGRESS NOTES
Subjective:      Chief Complaint:  No chief complaint on file.    Patient lives in [place]. She works at [employer] in the [employer].     History of Present Illness  Mariah Leyav is a 59 y.o. female with chronic migraine without aura. She has HTN. History of L-spine fusion and cervical cancer.     She presents today for a Botox follow up visit. Botox was administered for migraine prevention on 1/28/24, which has reduced her migraine frequency, severity, and duration. She has only had one migraine since two weeks after receiving Botox. It look two weeks for Botox to resolve her migraines.     She does feel that she has some mild neck weakness when looking up, which contributes to neck pain after. She does have chronic neck tension/spasm, which she gets massages for.     She takes prn Imitrex prn headache. BP is normal today. She checks her BP prior to taking this, and only takes with normal BP.     She tore a tendon in her left forearm. She was referred to a Rheumatologist for further evaluation, as this tear was non traumatic. She also has swelling to her ankles and other joint pain.     I have reviewed all of this patient's past medical and surgical histories as well as family and social histories and active allergies and medications as documented in the electronic medical record.    Review of Systems  Review of Systems   Constitutional:  Negative for activity change, appetite change, fatigue, fever and unexpected weight change.   HENT:  Negative for congestion, drooling, ear pain, hearing loss, mouth sores, sinus pressure, tinnitus, trouble swallowing and voice change.    Eyes: Negative.  Negative for photophobia and visual disturbance.         No Blurred vision   Respiratory:  Negative for apnea, choking and shortness of breath.    Cardiovascular:  Negative for chest pain, palpitations and leg swelling.   Gastrointestinal:  Negative for constipation, diarrhea, nausea and vomiting.   Genitourinary:  Negative for  dysuria.   Musculoskeletal:  Positive for arthralgias and neck pain. Negative for back pain, gait problem, joint swelling, myalgias and neck stiffness.   Skin:  Negative for rash.   Neurological:  Positive for headaches. Negative for dizziness, tremors, seizures, syncope, facial asymmetry, speech difficulty, weakness, light-headedness and numbness.   Hematological:  Does not bruise/bleed easily.   Psychiatric/Behavioral:  Negative for agitation, behavioral problems, confusion, decreased concentration, dysphoric mood, hallucinations, sleep disturbance and suicidal ideas. The patient is not nervous/anxious.        Objective:       There were no vitals filed for this visit.    Exam:  Gen Appearance, well developed/nourished in no apparent distress  CV: 2+ distal pulses with no edema or swelling  Neuro:  MS: Awake, alert, oriented to place, person, time, situation. Sustains attention. Recent/remote memory intact, Language is full to spontaneous speech/repetition/naming/comprehension. Fund of Knowledge is full. Circumferential speech.   CN: PERRL, Extraoccular movements and visual fields are full. Normal facial sensation and strength, Hearing symmetric, Tongue and Palate are midline and strong. Shoulder Shrug symmetric and strong.  Motor: Normal bulk, tone, no abnormal movements. 5/5 strength bilateral upper/lower extremities with 2+ reflexes and bilateral plantar response  Sensory: symmetric to light touch, pain, temp, and vibration/proprioception. Romberg negative  Cerebellar: Finger-nose,Heal-shin, Rapid alternating movements intact  Gait: Normal stance, no ataxia    Imaging:   10/28/24 MRI Brain:      FINDINGS:  Motion on some images.  No focal brain parenchymal signal abnormality.  No mass or mass effect.  No areas of restricted diffusion to indicate acute ischemia.  No evidence of intracranial hemorrhage.  Normal flow voids in the visualized cerebral vasculature.  Craniocervical junction appears intact and images  of sella demonstrate no abnormality.     Impression:     No abnormality.    10/28/24 MRA:   FINDINGS:  The intracranial portion of the right internal carotid artery is patent with no stenosis or aneurysm.  Right middle and right anterior cerebral arteries are patent with no stenosis or aneurysm.  Intracranial portion of left internal carotid artery is patent with no stenosis or aneurysm including the left middle and left anterior cerebral arteries.  Basilar artery is patent with no stenosis or aneurysm including the bilateral posterior cerebral arteries.     Impression:     No intracranial arterial stenosis or aneurysm.    8/2023 CT Head:   FINDINGS:  No ventricular or basal cistern effacement.  No evidence of acute intracranial hemorrhage, midline shift, mass, or mass effect.  No detected extra-axial fluid collections.  Bilateral basal ganglia calcifications are present.  Mild dependent fluid right maxillary sinus.  Remainder of the imaged paranasal sinuses and mastoid air cells are clear.  Calvarium is intact.     Impression:     No acute intracranial process detected.    9/2023 MRI C-spine:   FINDINGS:  Nonspecific straightening of the cervical lordosis.  Vertebral body heights are maintained.  No suspicious marrow lesion or edema.  No signal abnormalities within the spinal cord.     C2-3, no disc bulge canal or foraminal stenosis     C3-4, no significant disc bulge canal or foraminal stenosis.     C4-5, mildly narrowed disc with small disc bulge, mild uncinate process hypertrophy and facet arthropathy resulting mild right foraminal stenosis.  No central canal or left foraminal stenosis     C5-6, narrowed desiccated disc with mild disc bulge, uncinate hypertrophy and facet arthropathy result in mild bilateral foraminal stenosis.  No significant central canal stenosis.     C6-7, narrowed desiccated disc with slight disc bulge and uncinate process hypertrophy.  No significant stenosis     C7-T1, no disc bulge canal  or foraminal stenosis     Impression:     Cervical spondylosis greatest C4-5 C5-6 and C6-7 with partial effacement of anterior subarachnoid space, mild right C4-5 and bilateral C5-6 foraminal stenosis     Labs:   1/20/25 Folate and B12 normal    Assessment:      1. Chronic migraine with aura without status migrainosus, not intractable        Plan:   I recommend:   Continue Botox per migraine protocol. She has greater than 15 migraine days per month, which last for several hours at a time.   Administration: Inject 0.1 mL (5 units) intramuscularly at each injection site:     - muscle- 10 units divided in 2 sites   -Porcerus muscle- 5 units in 1 site   -Frontalis muscle- 20 units divided in 4 sites   -Temporalis muscle- 40 units divided in 8 sites   -Occipitalis muscle- 30 units divided in 6 sites   -Cervical paraspinals muscle- 20 units divided in 4 sites   -Trapezius muscle- 30 units divided in 31 sites     * Doses distributed bilaterally    There is an unavoidable waste of 45 units with Botox administration, as Botox is only supplied in 100 unit and 200 unit dosing.     -she failed Pamelor, and this was poorly tolerated.   -she failed Topamax prior.   -she is on an anti-HTN medication, and is not a candidate for beta blockade.     2. Regarding abortive therapy:   -Compazine was poorly tolerated, due to somnolence  -Nurtec was poorly tolerated, due to nausea.   -she may continue prn Imitrex 50 mg if BP less than 130/90. She does check this prior to use. Limit to no more than 2 days per week to avoid medication rebound headaches.     3. MRI Brain and MRA 2024 unremarkable.   Labs also unremarkable.     RTC for Botox then yearly

## 2025-04-24 ENCOUNTER — PROCEDURE VISIT (OUTPATIENT)
Dept: NEUROLOGY | Facility: CLINIC | Age: 60
End: 2025-04-24
Payer: COMMERCIAL

## 2025-04-24 DIAGNOSIS — G43.E09 CHRONIC MIGRAINE WITH AURA WITHOUT STATUS MIGRAINOSUS, NOT INTRACTABLE: Primary | ICD-10-CM

## 2025-04-24 NOTE — PROCEDURES
Procedures  BOTOX PROCEDURE NOTE     Date of Procedure: 04/24/2025      Reason for Proceedure: Chronic Migraine     Botox injections are being administered today for the prevention of chronic migraines, as Botox remains effective in reducing the frequency, severity, and duration of patient's migraines, which occurred greater than 15 days per month and lasted greater than several hours each day prior to receiving Botox injections.     Informed consent was obtained prior to performing this study. Two patient identifiers were confirmed with the patient prior to performing this study. A time out to determine correct patient and and agreement on procedure performed was conducted prior to the injections.     Procedure Details: After informed consent obtained the patient's head and upper neck was cleansed with alcohol rub and 155 Units of Botox (diluted 1:1) was injected in the following bilateral muscles:   10 units in corregator* (over 2 sites), 5 units in Procerus, 20 units Frontalis* (over 4 sites), 40 units in Temporalis* (over 4 sites), 30 units in occipitalis* (over 6 sites), 20 units in the cervical paraspinal muscles* (over 4 sites), and 30 units in Trapezius* (over 4 sites). The remaining 45 units were wasted to follow recommended guidelines for treatment of chonic migraines with Botox   *= denotes bilateral injections    The patient tolerated the procedure well with no more than 1cc of blood loss. He was observed for several minutes post injection and given a handout from [facility] regarding when and where to seek help if side effects are experienced.

## 2025-07-23 ENCOUNTER — PROCEDURE VISIT (OUTPATIENT)
Dept: NEUROLOGY | Facility: CLINIC | Age: 60
End: 2025-07-23
Payer: COMMERCIAL

## 2025-07-23 DIAGNOSIS — G43.E09 CHRONIC MIGRAINE WITH AURA WITHOUT STATUS MIGRAINOSUS, NOT INTRACTABLE: Primary | ICD-10-CM

## 2025-07-23 NOTE — PROCEDURES
Procedures  BOTOX PROCEDURE NOTE     Date of Procedure: 07/23/2025      Reason for Proceedure: Chronic Migraine     Botox injections are being administered today for the prevention of chronic migraines, as Botox remains effective in reducing the frequency, severity, and duration of patient's migraines, which occurred greater than 15 days per month and lasted greater than several hours each day prior to receiving Botox injections.     Informed consent was obtained prior to performing this study. Two patient identifiers were confirmed with the patient prior to performing this study. A time out to determine correct patient and and agreement on procedure performed was conducted prior to the injections.     Procedure Details: After informed consent obtained the patient's head and upper neck was cleansed with alcohol rub and 155 Units of Botox (diluted 1:1) was injected in the following bilateral muscles:   10 units in corregator* (over 2 sites), 5 units in Procerus, 20 units Frontalis* (over 4 sites), 40 units in Temporalis* (over 4 sites), 30 units in occipitalis* (over 6 sites), 20 units in the cervical paraspinal muscles* (over 4 sites), and 30 units in Trapezius* (over 4 sites). The remaining 45 units were wasted to follow recommended guidelines for treatment of chonic migraines with Botox   *= denotes bilateral injections    The patient tolerated the procedure well with no more than 1cc of blood loss. He was observed for several minutes post injection and [facility] regarding when and where to seek help if side effects are experienced.